# Patient Record
Sex: FEMALE | Race: WHITE | Employment: UNEMPLOYED | URBAN - METROPOLITAN AREA
[De-identification: names, ages, dates, MRNs, and addresses within clinical notes are randomized per-mention and may not be internally consistent; named-entity substitution may affect disease eponyms.]

---

## 2021-06-04 ENCOUNTER — OFFICE VISIT (OUTPATIENT)
Dept: PRIMARY CARE CLINIC | Age: 71
End: 2021-06-04
Payer: MEDICARE

## 2021-06-04 VITALS
TEMPERATURE: 97.3 F | HEIGHT: 63 IN | HEART RATE: 81 BPM | WEIGHT: 108 LBS | BODY MASS INDEX: 19.14 KG/M2 | OXYGEN SATURATION: 97 %

## 2021-06-04 DIAGNOSIS — J98.8 RESPIRATORY INFECTION: Primary | ICD-10-CM

## 2021-06-04 PROBLEM — Z72.0 TOBACCO USER: Status: ACTIVE | Noted: 2017-10-30

## 2021-06-04 PROBLEM — E55.9 VITAMIN D DEFICIENCY: Status: ACTIVE | Noted: 2018-04-13

## 2021-06-04 PROBLEM — M65.311 TRIGGER FINGER OF RIGHT THUMB: Status: ACTIVE | Noted: 2019-01-04

## 2021-06-04 PROBLEM — R05.9 COUGH: Status: ACTIVE | Noted: 2020-02-17

## 2021-06-04 PROCEDURE — G8420 CALC BMI NORM PARAMETERS: HCPCS | Performed by: FAMILY MEDICINE

## 2021-06-04 PROCEDURE — 4004F PT TOBACCO SCREEN RCVD TLK: CPT | Performed by: FAMILY MEDICINE

## 2021-06-04 PROCEDURE — 3017F COLORECTAL CA SCREEN DOC REV: CPT | Performed by: FAMILY MEDICINE

## 2021-06-04 PROCEDURE — 1123F ACP DISCUSS/DSCN MKR DOCD: CPT | Performed by: FAMILY MEDICINE

## 2021-06-04 PROCEDURE — 1090F PRES/ABSN URINE INCON ASSESS: CPT | Performed by: FAMILY MEDICINE

## 2021-06-04 PROCEDURE — 4040F PNEUMOC VAC/ADMIN/RCVD: CPT | Performed by: FAMILY MEDICINE

## 2021-06-04 PROCEDURE — 99203 OFFICE O/P NEW LOW 30 MIN: CPT | Performed by: FAMILY MEDICINE

## 2021-06-04 PROCEDURE — G8427 DOCREV CUR MEDS BY ELIG CLIN: HCPCS | Performed by: FAMILY MEDICINE

## 2021-06-04 PROCEDURE — G8400 PT W/DXA NO RESULTS DOC: HCPCS | Performed by: FAMILY MEDICINE

## 2021-06-04 RX ORDER — AZITHROMYCIN 250 MG/1
TABLET, FILM COATED ORAL
Qty: 1 PACKET | Refills: 0 | Status: SHIPPED | OUTPATIENT
Start: 2021-06-04 | End: 2021-06-14

## 2021-06-04 RX ORDER — ALBUTEROL SULFATE 90 UG/1
2 AEROSOL, METERED RESPIRATORY (INHALATION) EVERY 6 HOURS PRN
COMMUNITY
Start: 2020-07-16

## 2021-06-04 SDOH — ECONOMIC STABILITY: FOOD INSECURITY: WITHIN THE PAST 12 MONTHS, THE FOOD YOU BOUGHT JUST DIDN'T LAST AND YOU DIDN'T HAVE MONEY TO GET MORE.: NEVER TRUE

## 2021-06-04 SDOH — ECONOMIC STABILITY: FOOD INSECURITY: WITHIN THE PAST 12 MONTHS, YOU WORRIED THAT YOUR FOOD WOULD RUN OUT BEFORE YOU GOT MONEY TO BUY MORE.: NEVER TRUE

## 2021-06-04 ASSESSMENT — PATIENT HEALTH QUESTIONNAIRE - PHQ9
1. LITTLE INTEREST OR PLEASURE IN DOING THINGS: 0
SUM OF ALL RESPONSES TO PHQ QUESTIONS 1-9: 0
SUM OF ALL RESPONSES TO PHQ9 QUESTIONS 1 & 2: 0
2. FEELING DOWN, DEPRESSED OR HOPELESS: 0

## 2021-06-04 ASSESSMENT — ENCOUNTER SYMPTOMS
RHINORRHEA: 1
DIARRHEA: 0
ABDOMINAL PAIN: 0
SORE THROAT: 0
SHORTNESS OF BREATH: 1
NAUSEA: 0
COUGH: 1
VOMITING: 0
WHEEZING: 1

## 2021-06-04 ASSESSMENT — SOCIAL DETERMINANTS OF HEALTH (SDOH): HOW HARD IS IT FOR YOU TO PAY FOR THE VERY BASICS LIKE FOOD, HOUSING, MEDICAL CARE, AND HEATING?: NOT HARD AT ALL

## 2021-06-04 NOTE — PROGRESS NOTES
[facility] [id number] [facility] [place] [facility]  [address] [address]  Dept: [phone]  Dept Fax: [phone]    Renee Carlson is a 70 y.o. female who presents today for her medical conditions/complaintsas noted below. Renee Carlson is c/o of Cough and Congestion (x2 weeks, COPD- states she gets very bad sinus and cough/bronchitis)        HPI:     Cough  This is a new problem. The current episode started 1 to 4 weeks ago (2 weeks). The problem has been unchanged. The problem occurs constantly. The cough is productive of sputum. Associated symptoms include chills, nasal congestion, rhinorrhea, shortness of breath and wheezing. Pertinent negatives include no chest pain, ear pain, fever, headaches, myalgias, rash or sore throat. Nothing aggravates the symptoms. Risk factors for lung disease include smoking/tobacco exposure. She has tried a beta-agonist inhaler and OTC cough suppressant (coricidin) for the symptoms. The treatment provided mild relief. Her past medical history is significant for bronchitis, COPD and emphysema.  Medical history of GERD and hypertension, vitamin D deficiency    has similar symptoms  Denies concern for COVID at this time    Past Medical History:   Diagnosis Date    Emphysema     early stage    HTN (hypertension)     Past medical history reviewed and pertinent positives/negatives in the HPI    Past Surgical History:   Procedure Laterality Date    COSMETIC SURGERY  2012    eyes neck    DILATION AND CURETTAGE OF UTERUS      after SAB    TONSILLECTOMY         Family History   Problem Relation Age of Onset   Ashley Levine Hypertension Mother    Ashley Levine Osteoporosis Mother     Diabetes Sister     Hypertension Sister     Thyroid Disease Sister     Obesity Sister        Social History     Tobacco Use    Smoking status: Current Some Day Smoker     Packs/day: 0.25     Years: 40.00     Pack years: 10.00     Types: Cigarettes    Smokeless tobacco: Never Used Substance Use Topics    Alcohol use: Yes     Alcohol/week: 7.0 standard drinks     Types: 7 Standard drinks or equivalent per week     Comment: 0-2 beer every night      Current Outpatient Medications   Medication Sig Dispense Refill    albuterol sulfate HFA (PROAIR HFA) 108 (90 Base) MCG/ACT inhaler Inhale 2 puffs into the lungs every 6 hours as needed      azithromycin (ZITHROMAX) 250 MG tablet Use as directed 1 packet 0    lisinopril (PRINIVIL;ZESTRIL) 5 MG tablet       Multiple Vitamin (MULTI VITAMIN DAILY PO) Take by mouth      Calcium Carbonate-Vitamin D (CALTRATE 600+D PO) Take by mouth      Coenzyme Q10 (CO Q 10 PO) Take by mouth        No current facility-administered medications for this visit. Allergies   Allergen Reactions    Clindamycin/Lincomycin     Pcn [Penicillins] Itching, Palpitations and Rash       Health Maintenance   Topic Date Due    Hepatitis C screen  Never done    COVID-19 Vaccine (1) Never done    DTaP/Tdap/Td vaccine (1 - Tdap) Never done    Breast cancer screen  Never done    Shingles Vaccine (1 of 2) Never done    Colon cancer screen colonoscopy  Never done    DEXA (modify frequency per FRAX score)  Never done    Potassium monitoring  06/23/2016    Creatinine monitoring  06/23/2016    Lipid screen  06/23/2020    Flu vaccine  Completed    Pneumococcal 65+ years Vaccine  Completed    Hepatitis A vaccine  Aged Out    Hepatitis B vaccine  Aged Out    Hib vaccine  Aged Out    Meningococcal (ACWY) vaccine  Aged Out       :      Review of Systems   Constitutional: Positive for chills. Negative for fever. HENT: Positive for rhinorrhea. Negative for ear pain and sore throat. Respiratory: Positive for cough, shortness of breath and wheezing. Cardiovascular: Negative for chest pain and leg swelling. Gastrointestinal: Negative for abdominal pain, diarrhea, nausea and vomiting. Musculoskeletal: Negative for myalgias. Skin: Negative for pallor and rash. Neurological: Negative for headaches. Hematological: Negative for adenopathy. Objective:     Physical Exam  Vitals and nursing note reviewed. Constitutional:       Appearance: Normal appearance. She is normal weight. She is ill-appearing. HENT:      Head: Normocephalic and atraumatic. Right Ear: Hearing normal.      Left Ear: Hearing normal.      Nose: Nose normal.      Mouth/Throat:      Lips: Pink. Mouth: Mucous membranes are moist.      Pharynx: Oropharynx is clear. Eyes:      Extraocular Movements: Extraocular movements intact. Conjunctiva/sclera: Conjunctivae normal.   Cardiovascular:      Rate and Rhythm: Normal rate and regular rhythm. Heart sounds: Normal heart sounds. Pulmonary:      Effort: Pulmonary effort is normal.      Breath sounds: Normal breath sounds. Comments: Diminished breath sounds b/l  Musculoskeletal:      Cervical back: Normal range of motion. No muscular tenderness. Skin:     General: Skin is warm and dry. Neurological:      Mental Status: She is alert and oriented to person, place, and time. Mental status is at baseline. Psychiatric:         Mood and Affect: Mood normal.         Behavior: Behavior normal.         Thought Content: Thought content normal.         Judgment: Judgment normal.       Pulse 81   Temp 97.3 °F (36.3 °C)   Ht 5' 3\" (1.6 m)   Wt 108 lb (49 kg)   SpO2 97%   BMI 19.13 kg/m²     Assessment:       Diagnosis Orders   1. Respiratory infection  azithromycin (ZITHROMAX) 250 MG tablet       Plan:    Take zpak as prescribed for respiratory infection. Patient declined oral steroid at this time. If symptoms worsen or do not improve please follow-up with PCP or return to clinic    No orders of the defined types were placed in this encounter.     Orders Placed This Encounter   Medications    azithromycin (ZITHROMAX) 250 MG tablet     Sig: Use as directed     Dispense:  1 packet     Refill:  0      Patient given educational materials - see patient instructions. Discussed use, benefit, and side effects of prescribed medications. All patient questions answered. Pt voiced understanding. .  Patient agreed with treatment plan. Follow up as directed.      Electronicallysigned by Viktor Guillaume MD on 6/4/2021 at 11:33 AM

## 2021-06-04 NOTE — PATIENT INSTRUCTIONS
Patient Education        Learning About COPD  What is COPD? COPD is a lung disease that makes it hard to breathe. COPD stands for chronic obstructive pulmonary disease. It is caused by damage to the lungs over many years, usually from smoking. COPD is a mix of two diseases: chronic bronchitis and emphysema. Other things that may put you at risk for COPD include breathing chemical fumes, dust, or air pollution over a long period of time. Secondhand smoke is also bad. In chronic bronchitis, the airways that carry air to the lungs (bronchial tubes) get inflamed and make a lot of mucus. This can narrow or block the airways, making it hard for you to breathe. In emphysema, the air sacs in your lungs are damaged and lose their stretch. Less air gets in and out of your lungs, which makes you feel short of breath. What can you expect when you have COPD? COPD gets worse over time. You cannot undo the damage to your lungs. Over time, you may find that:  · You get short of breath even when you do simple things like get dressed or fix a meal.  · It is hard to eat or exercise. · You lose weight and feel weaker. But there are things you can do to prevent more damage and feel better. What are the symptoms? The main symptoms are:  · A cough that will not go away. · Mucus that comes up when you cough. · Shortness of breath that gets worse with activity. At times, your symptoms may suddenly flare up and get much worse. This is a called a COPD exacerbation (say \"egg-SIN--BAY-maria esther\"). When this happens, your usual symptoms quickly get worse and stay bad. This can be dangerous. You may have to go to the hospital.  How can you keep COPD from getting worse? Not smoking is the best way to keep COPD from getting worse. If you need help quitting, talk to your doctor about stop-smoking programs and medicines. Also, be sure to get your flu and pneumococcal vaccines.  And avoid air pollution, fumes, and dust as much as you can.  How is COPD treated? COPD is treated with medicines and oxygen. You also can take steps at home to stay healthy and keep your COPD from getting worse. Medicines and oxygen therapy  · You may be taking medicines such as:  ? Bronchodilators. These help open your airways and make breathing easier. Bronchodilators are either short-acting (work for 6 to 9 hours) or long-acting (work for 24 hours). You inhale most bronchodilators, so they start to act quickly. Always carry your quick-relief inhaler with you in case you need it while you are away from home. ? Corticosteroids. These reduce airway inflammation. They come in pill or inhaled form. You must take these medicines every day for them to work well. · Take your medicines exactly as prescribed. Call your doctor if you think you are having a problem with your medicine. · Oxygen therapy boosts the amount of oxygen in your blood and helps you breathe easier. Use the flow rate your doctor has recommended, and do not change it without talking to your doctor first.  Other care at home  · If your doctor recommends it, get more exercise. Walking is a good choice. Bit by bit, increase the amount you walk every day. Try for at least 30 minutes on most days of the week. · Learn breathing methods--such as breathing through pursed lips--to help you become less short of breath. · If your doctor has not set you up with a pulmonary rehabilitation program, talk to him or her about whether rehab is right for you. Rehab includes exercise programs, education about your disease and how to manage it, help with diet and other changes, and emotional support. · Eat regular, healthy meals. Use bronchodilators about 1 hour before you eat to make it easier to eat. Eat several small meals instead of three large ones. Drink beverages at the end of the meal. Avoid foods that are hard to chew. Follow-up care is a key part of your treatment and safety.  Be sure to make and go to all appointments, and call your doctor if you are having problems. It's also a good idea to know your test results and keep a list of the medicines you take. Where can you learn more? Go to https://[email or web address].Knewbi.com. org and sign in to your [employer] account. Enter V314 in the [facility] box to learn more about \"Learning About COPD. \"     If you do not have an account, please click on the \"Sign Up Now\" link. Current as of: October 26, 2020               Content Version: 12.8  © 7216-9915 [employer], [employer]. Care instructions adapted under license by [facility] [address] If you have questions about a medical condition or this instruction, always ask your healthcare professional. [address] any warranty or liability for your use of this information.        Take zpak as prescribed for respiratory infection  If symptoms worsen or do not improve please follow-up with PCP or return to clinic

## 2021-07-04 PROBLEM — R05.9 COUGH: Status: RESOLVED | Noted: 2020-02-17 | Resolved: 2021-07-04
